# Patient Record
Sex: FEMALE | Race: WHITE | NOT HISPANIC OR LATINO | Employment: FULL TIME | URBAN - METROPOLITAN AREA
[De-identification: names, ages, dates, MRNs, and addresses within clinical notes are randomized per-mention and may not be internally consistent; named-entity substitution may affect disease eponyms.]

---

## 2017-01-05 ENCOUNTER — ALLSCRIPTS OFFICE VISIT (OUTPATIENT)
Dept: OTHER | Facility: OTHER | Age: 47
End: 2017-01-05

## 2017-01-05 DIAGNOSIS — R63.4 ABNORMAL WEIGHT LOSS: ICD-10-CM

## 2017-01-05 DIAGNOSIS — R35.1 NOCTURIA: ICD-10-CM

## 2017-01-05 DIAGNOSIS — F10.10 UNCOMPLICATED ALCOHOL ABUSE: ICD-10-CM

## 2017-01-05 DIAGNOSIS — D64.9 ANEMIA: ICD-10-CM

## 2017-01-05 DIAGNOSIS — Z12.31 ENCOUNTER FOR SCREENING MAMMOGRAM FOR MALIGNANT NEOPLASM OF BREAST: ICD-10-CM

## 2017-09-26 ENCOUNTER — GENERIC CONVERSION - ENCOUNTER (OUTPATIENT)
Dept: OTHER | Facility: OTHER | Age: 47
End: 2017-09-26

## 2018-01-13 VITALS
DIASTOLIC BLOOD PRESSURE: 60 MMHG | HEIGHT: 66 IN | BODY MASS INDEX: 18.22 KG/M2 | SYSTOLIC BLOOD PRESSURE: 130 MMHG | HEART RATE: 96 BPM | WEIGHT: 113.38 LBS

## 2019-01-25 ENCOUNTER — TRANSCRIBE ORDERS (OUTPATIENT)
Dept: ADMINISTRATIVE | Facility: HOSPITAL | Age: 49
End: 2019-01-25

## 2019-01-25 DIAGNOSIS — R20.0 NUMBNESS: Primary | ICD-10-CM

## 2019-11-30 ENCOUNTER — APPOINTMENT (EMERGENCY)
Dept: NON INVASIVE DIAGNOSTICS | Facility: HOSPITAL | Age: 49
End: 2019-11-30
Payer: COMMERCIAL

## 2019-11-30 ENCOUNTER — HOSPITAL ENCOUNTER (EMERGENCY)
Facility: HOSPITAL | Age: 49
Discharge: HOME | End: 2019-11-30
Attending: EMERGENCY MEDICINE
Payer: COMMERCIAL

## 2019-11-30 VITALS
WEIGHT: 110.23 LBS | DIASTOLIC BLOOD PRESSURE: 81 MMHG | RESPIRATION RATE: 18 BRPM | HEART RATE: 109 BPM | TEMPERATURE: 99.3 F | OXYGEN SATURATION: 100 % | BODY MASS INDEX: 18.06 KG/M2 | SYSTOLIC BLOOD PRESSURE: 121 MMHG

## 2019-11-30 DIAGNOSIS — I82.409 ACUTE DVT (DEEP VENOUS THROMBOSIS) (HCC): Primary | ICD-10-CM

## 2019-11-30 DIAGNOSIS — R60.0 BILATERAL LEG EDEMA: ICD-10-CM

## 2019-11-30 LAB
ANION GAP SERPL CALCULATED.3IONS-SCNC: 10 MMOL/L (ref 4–13)
BUN SERPL-MCNC: 11 MG/DL (ref 5–25)
CALCIUM SERPL-MCNC: 8.2 MG/DL (ref 8.3–10.1)
CHLORIDE SERPL-SCNC: 105 MMOL/L (ref 100–108)
CO2 SERPL-SCNC: 22 MMOL/L (ref 21–32)
CREAT SERPL-MCNC: 0.84 MG/DL (ref 0.6–1.3)
D DIMER PPP FEU-MCNC: 1.09 UG/ML FEU
ERYTHROCYTE [DISTWIDTH] IN BLOOD BY AUTOMATED COUNT: 13.8 % (ref 11.6–15.1)
GFR SERPL CREATININE-BSD FRML MDRD: 93 ML/MIN/1.73SQ M
GLUCOSE SERPL-MCNC: 110 MG/DL (ref 65–140)
HCT VFR BLD AUTO: 30 % (ref 34.8–46.1)
HGB BLD-MCNC: 10.4 G/DL (ref 11.5–15.4)
MCH RBC QN AUTO: 33.7 PG (ref 26.8–34.3)
MCHC RBC AUTO-ENTMCNC: 34.7 G/DL (ref 31.4–37.4)
MCV RBC AUTO: 97 FL (ref 82–98)
PLATELET # BLD AUTO: 227 THOUSANDS/UL (ref 149–390)
PMV BLD AUTO: 9.2 FL (ref 8.9–12.7)
POTASSIUM SERPL-SCNC: 3.7 MMOL/L (ref 3.5–5.3)
RBC # BLD AUTO: 3.09 MILLION/UL (ref 3.81–5.12)
SODIUM SERPL-SCNC: 137 MMOL/L (ref 136–145)
WBC # BLD AUTO: 14.58 THOUSAND/UL (ref 4.31–10.16)

## 2019-11-30 PROCEDURE — 36415 COLL VENOUS BLD VENIPUNCTURE: CPT | Performed by: EMERGENCY MEDICINE

## 2019-11-30 PROCEDURE — 93970 EXTREMITY STUDY: CPT

## 2019-11-30 PROCEDURE — 99284 EMERGENCY DEPT VISIT MOD MDM: CPT

## 2019-11-30 PROCEDURE — 99284 EMERGENCY DEPT VISIT MOD MDM: CPT | Performed by: EMERGENCY MEDICINE

## 2019-11-30 PROCEDURE — 85027 COMPLETE CBC AUTOMATED: CPT | Performed by: EMERGENCY MEDICINE

## 2019-11-30 PROCEDURE — 85379 FIBRIN DEGRADATION QUANT: CPT | Performed by: EMERGENCY MEDICINE

## 2019-11-30 PROCEDURE — 80048 BASIC METABOLIC PNL TOTAL CA: CPT | Performed by: EMERGENCY MEDICINE

## 2019-11-30 RX ORDER — AZITHROMYCIN 250 MG/1
TABLET, FILM COATED ORAL
COMMUNITY
Start: 2019-11-25 | End: 2019-11-30

## 2019-11-30 RX ORDER — HYDROXYZINE HYDROCHLORIDE 25 MG/1
25-50 TABLET, FILM COATED ORAL EVERY 8 HOURS PRN
COMMUNITY
Start: 2019-08-13

## 2019-11-30 NOTE — DISCHARGE INSTRUCTIONS
Deep Venous Thrombosis   WHAT YOU NEED TO KNOW:   Deep venous thrombosis (DVT) is a blood clot that forms in a deep vein of the body  The deep veins in the legs, thighs, and hips are the most common sites for DVT  DVT can also occur in a deep vein within your arms  The clot prevents the normal flow of blood in the vein  The blood backs up and causes pain and swelling  The DVT can break into smaller pieces and travel to your lungs and cause a blockage called a pulmonary embolism  A pulmonary embolism can become life-threatening  DISCHARGE INSTRUCTIONS:   Call 911 for any of the following: You feel lightheaded, short of breath, and have chest pain  You cough up blood  Return to the emergency department if:   Your symptoms get worse  You develop new DVT symptoms in another leg or arm  Contact your healthcare provider if:   Your gums or nose bleed  You see blood in your urine or bowel movements  Your bowel movements are black or darker than normal     You have questions or concerns about your conditions or care

## 2019-11-30 NOTE — ED PROVIDER NOTES
Emergency Department Note- Orquidea Chiang 44 y o  female MRN: [id number]    Unit/Bed#: ED 28 Encounter: [id number]        History of Present Illness     57-year-old female presents today with several complaints  She says for the past several months she has noticed some increased bilateral lower extremity edema, typically of her ankles, usually much worse after she stands a long time throughout the day, is much better in the mornings when she 1st wakes up  She says the last 1 week she has noticed that her left calf and left ankle have been more swollen than the right and the amount of edema has increased somewhat  She has also noticed over the last several days some increased swelling in the soft tissue of the inside of her left and right knee  She has no pain, she does have some chronic decreased sensation to both of her feet and says it feels like his she is wearing shoes when she is not even wearing shoes  Patient denies any prolonged travel history, no recent long travel, no immobilizations, or hospitalizations  No chest pain, no shortness of breath, no orthopnea  She has also noticed some facial swelling for the past 5 days, had a sore throat for the past 1 week  Hurts a little to swallow but is able to swallow without difficulty  No voice change  No fever, no chills, no headache, stiff neck  She says she has talked to her doctor several times about the lower extremity edema and been told that it is normal, she does not believe she has had any investigations regarding this  She did see her primary care physician 5 days ago regarding her sore throat, was prescribed a Z-Eyad which she started 2 days later and is currently on day 3  She says that she also mentioned the edema to her doctor and no testing was done  She presents today because of the concerns regarding the facial swelling and the edema and wanting to know what is causing that          REVIEW OF SYSTEMS     Constitutional:  No recent weight gains or losses   Eyes:  No visual changes   ENT:  No tinnitus or hearing changes   Cardiac: No chest pain or palpitations   Respiratory:  No cough or shortness of breath   Abdominal:  No nausea or vomiting   Urinary: No dysuria or hematuria   Hematologic: No easy bruising or bleeding   Skin: No rash   Musculoskeletal: As per HPI   Neurologic: As per HPI   Psychiatric: No mood changes      Historical Information   History reviewed  No pertinent past medical history  History reviewed  No pertinent surgical history  Social History   Social History     Substance and Sexual Activity   Alcohol Use Yes    Frequency: Never     Social History     Substance and Sexual Activity   Drug Use Never     Social History     Tobacco Use   Smoking Status Current Every Day Smoker    Packs/day: 0 50    Types: Cigarettes   Smokeless Tobacco Never Used     Family History: History reviewed  No pertinent family history  MEDICATIONS:  Azithromycin Z-Eyad  ALLERGIES:  Allergies   Allergen Reactions    Shellfish-Derived Products        Vitals: Blood pressure 121/81, pulse (!) 109, temperature 99 3 °F (37 4 °C), temperature source Temporal, resp  rate 18, weight 50 kg (110 lb 3 7 oz), SpO2 100 %  PHYSICAL EXAM    General:  Patient is well-appearing  Head:  Atraumatic  Eyes:  Conjunctiva pink  ENT:  Mucous membranes are moist, Mucous membranes moist  No swelling of the posterior pharynx  No tonsillar enlargement, exudate, lesions  No swelling in the floor of the mouth  No uvula deviation  No trismus  No swelling over either parotid gland, there is no facial erythema, I do not appreciate any facial edema  There is no palpable stone or abscess in either cheek    Neck:  Supple, no stridor, no internal jugular vein tenderness  Cardiac:  S1-S2, without murmurs  Lungs:  Clear to auscultation bilaterally, no rales, no rhonchi  Abdomen:  Soft, nontender, normal bowel sounds, no CVA tenderness, no tympany, no rigidity, no guarding  Extremities:  She has some mild bilateral pitting pedal edema, left calf and ankle are 1 cm greater than the right  There is no warmth or redness on either leg, compartments are soft bilaterally  There is no ligamentous laxity, no warmth, no redness, negative Lachman, negative Adams, negative valgus and varus stress test, no effusion on either knee  She has no warmth or redness to the bilateral hips, knees, ankles  DP and PT pulses are intact bilaterally  No bony tenderness to bilateral hips, femurs, knees, tibia, fibula, foot or ankle  Sensation light touch is equal and symmetric throughout the bilateral lower extremities although she does say it feels a little bit decreased in a stocking-like distribution from her mid ankles down bilaterally  Neurologic:  Awake, fluent speech, normal comprehension  AAOx3  Skin:  Pink warm and dry  Psychiatric:  Alert, pleasant, cooperative    Labs Reviewed   BASIC METABOLIC PANEL - Abnormal       Result Value Ref Range Status    Sodium 137  136 - 145 mmol/L Final    Potassium 3 7  3 5 - 5 3 mmol/L Final    Chloride 105  100 - 108 mmol/L Final    CO2 22  21 - 32 mmol/L Final    ANION GAP 10  4 - 13 mmol/L Final    BUN 11  5 - 25 mg/dL Final    Creatinine 0 84  0 60 - 1 30 mg/dL Final    Comment: Standardized to IDMS reference method    Glucose 110  65 - 140 mg/dL Final    Comment:   If the patient is fasting, the ADA then defines impaired fasting glucose as > 100 mg/dL and diabetes as > or equal to 123 mg/dL  Specimen collection should occur prior to Sulfasalazine administration due to the potential for falsely depressed results  Specimen collection should occur prior to Sulfapyridine administration due to the potential for falsely elevated results      Calcium 8 2 (*) 8 3 - 10 1 mg/dL Final    eGFR 93  ml/min/1 73sq m Final    Narrative:     [place] guidelines for Chronic Kidney Disease (CKD):     Stage 1 with normal or high GFR (GFR > 90 mL/min/1 73 square meters)    Stage 2 Mild CKD (GFR = 60-89 mL/min/1 73 square meters)    Stage 3A Moderate CKD (GFR = 45-59 mL/min/1 73 square meters)    Stage 3B Moderate CKD (GFR = 30-44 mL/min/1 73 square meters)    Stage 4 Severe CKD (GFR = 15-29 mL/min/1 73 square meters)    Stage 5 End Stage CKD (GFR <15 mL/min/1 73 square meters)  Note: GFR calculation is accurate only with a steady state creatinine   CBC AND PLATELET - Abnormal    WBC 14 58 (*) 4 31 - 10 16 Thousand/uL Final    RBC 3 09 (*) 3 81 - 5 12 Million/uL Final    Hemoglobin 10 4 (*) 11 5 - 15 4 g/dL Final    Hematocrit 30 0 (*) 34 8 - 46 1 % Final    MCV 97  82 - 98 fL Final    MCH 33 7  26 8 - 34 3 pg Final    MCHC 34 7  31 4 - 37 4 g/dL Final    RDW 13 8  11 6 - 15 1 % Final    Platelets 825  242 - 390 Thousands/uL Final    MPV 9 2  8 9 - 12 7 fL Final   D-DIMER, QUANTITATIVE - Abnormal    D-Dimer, Quant 1 09 (*) <0 50 ug/ml FEU Final    Comment:   Reference and upper limits to exclude DVT and PE are the same  Do not use to exclude if clinical symptoms are present  Pregnant women:  1st trimester:  <0 22 - 1 06 ug/ml FEU  2nd trimester:  <0 22 - 1 88 ug/ml FEU  3rd trimester:   0 24 - 3 28 ug/ml FEU    Note: Normal ranges may not apply to patients who are transgender, non-binary, or whose legal sex, sex at birth, and gender identity differ  Medications - No data to display    VAS lower limb venous duplex study, complete bilateral    (Results Pending)       Vitals:    11/30/19 1313 11/30/19 1611   BP: 113/70 121/81   TempSrc: Temporal    Pulse: (!) 115 (!) 109   Resp: 16 18   Patient Position - Orthostatic VS: Sitting Lying   Temp: 99 3 °F (37 4 °C)        ED Course as of Nov 30 1636   Sat Nov 30, 2019   1620 Bilateral lower extremity venous Dopplers were reported to me by vascular to show an acute right peroneal vein DVT      1622 On reassessment there was no change from the above physical exam findings    Patient denies having any rectal bleeding or noticing any bleeding anywhere          Assessment/Plan     ED Medical Decision Making:    Suspect the patient has leg swelling is secondary to the peroneal vein DVT  She also may have an element of dependent edema, since she does have some swelling on the contralateral leg as well  While she has been little tachycardic, she has been little anxious here in the ED, has no chest pain, no shortness of breath, do not believe she requires further workup for PE at this point  Additionally units she were to have a pulmonary embolism, her PESI score 51 puts her at very low risk and I believe that outpatient treatment for her symptoms is appropriate  She is also quite comfortable going home with outpatient follow-up  Supportive care, importance of follow-up and return precautions were discussed with the patient  Patient expressed understanding  Time reflects when diagnosis was documented in both MDM as applicable and the Disposition within this note     Time User Action Codes Description Comment    11/30/2019  4:26 PM Cullen Franz Add [I82 [id number]] Acute DVT (deep venous thrombosis) ([facility] [address] )     11/30/2019  4:26 PM Cullen Franz Add [R60 0] Bilateral leg edema       ED Disposition     ED Disposition Condition Date/Time Comment    Discharge Stable Sat Nov 30, 2019  4:26 PM Shruthi Salgado discharge to home/self care              Follow-up Information     Follow up With Specialties Details Why Contact Info    Raheel Mcnally MD Emergency Medicine Schedule an appointment as soon as possible for a visit in 2 days  [address] [facility]  [address]  [place] [address]  [phone]            Discharge Medication List as of 11/30/2019  4:29 PM      START taking these medications    Details   apixaban (ELIQUIS) 5 mg Take 10 mg PO BID x 7, then 5 mg PO BID from there on, [address], DO  11/30/19 8216

## 2019-12-01 PROCEDURE — 93970 EXTREMITY STUDY: CPT | Performed by: SURGERY

## 2021-12-23 ENCOUNTER — APPOINTMENT (EMERGENCY)
Dept: CT IMAGING | Facility: HOSPITAL | Age: 51
End: 2021-12-23
Payer: COMMERCIAL

## 2021-12-23 ENCOUNTER — HOSPITAL ENCOUNTER (EMERGENCY)
Facility: HOSPITAL | Age: 51
Discharge: HOME | End: 2021-12-23
Attending: EMERGENCY MEDICINE
Payer: COMMERCIAL

## 2021-12-23 VITALS
HEART RATE: 86 BPM | OXYGEN SATURATION: 100 % | RESPIRATION RATE: 16 BRPM | TEMPERATURE: 97.8 F | SYSTOLIC BLOOD PRESSURE: 125 MMHG | DIASTOLIC BLOOD PRESSURE: 82 MMHG

## 2021-12-23 DIAGNOSIS — R41.3 MEMORY CHANGE: Primary | ICD-10-CM

## 2021-12-23 LAB
ANION GAP SERPL CALCULATED.3IONS-SCNC: 6 MMOL/L (ref 5–14)
ATRIAL RATE: 99 BPM
BASOPHILS # BLD AUTO: 0.09 THOUSAND/UL (ref 0–0.1)
BASOPHILS NFR MAR MANUAL: 1 % (ref 0–1)
BUN SERPL-MCNC: <2 MG/DL (ref 5–25)
CALCIUM SERPL-MCNC: 9.5 MG/DL (ref 8.4–10.2)
CHLORIDE SERPL-SCNC: 100 MMOL/L (ref 97–108)
CO2 SERPL-SCNC: 30 MMOL/L (ref 22–30)
CREAT SERPL-MCNC: 0.62 MG/DL (ref 0.6–1.2)
EOSINOPHIL # BLD AUTO: 0.09 THOUSAND/UL (ref 0–0.4)
EOSINOPHIL NFR BLD MANUAL: 1 % (ref 0–6)
ERYTHROCYTE [DISTWIDTH] IN BLOOD BY AUTOMATED COUNT: 13 %
ETHANOL SERPL-MCNC: <10 MG/DL (ref 0–10)
FLUAV RNA RESP QL NAA+PROBE: NEGATIVE
FLUBV RNA RESP QL NAA+PROBE: NEGATIVE
GFR SERPL CREATININE-BSD FRML MDRD: 103 ML/MIN/1.73SQ M
GLUCOSE SERPL-MCNC: 129 MG/DL (ref 70–99)
HCT VFR BLD AUTO: 41.6 % (ref 36–46)
HGB BLD-MCNC: 13.9 G/DL (ref 12–16)
LYMPHOCYTES # BLD AUTO: 3.27 THOUSAND/UL (ref 0.5–4)
LYMPHOCYTES # BLD AUTO: 38 % (ref 25–45)
MCH RBC QN AUTO: 32.4 PG (ref 26–34)
MCHC RBC AUTO-ENTMCNC: 33.4 G/DL (ref 31–36)
MCV RBC AUTO: 97 FL (ref 80–100)
MONOCYTES # BLD AUTO: 1.29 THOUSAND/UL (ref 0.2–0.9)
MONOCYTES NFR BLD AUTO: 15 % (ref 1–10)
NEUTS SEG # BLD: 3.87 THOUSAND/UL (ref 1.8–7.8)
NEUTS SEG NFR BLD AUTO: 45 %
P AXIS: 77 DEGREES
PLATELET # BLD AUTO: 404 THOUSANDS/UL (ref 150–450)
PLATELET BLD QL SMEAR: ABNORMAL
PMV BLD AUTO: 7.1 FL (ref 8.9–12.7)
POTASSIUM SERPL-SCNC: 3.3 MMOL/L (ref 3.6–5)
PR INTERVAL: 124 MS
QRS AXIS: 53 DEGREES
QRSD INTERVAL: 72 MS
QT INTERVAL: 402 MS
QTC INTERVAL: 515 MS
RBC # BLD AUTO: 4.3 MILLION/UL (ref 4–5.2)
RBC MORPH BLD: NORMAL
RSV RNA RESP QL NAA+PROBE: NEGATIVE
SARS-COV-2 RNA RESP QL NAA+PROBE: NEGATIVE
SODIUM SERPL-SCNC: 136 MMOL/L (ref 137–147)
T WAVE AXIS: 56 DEGREES
TOTAL CELLS COUNTED SPEC: 100
VENTRICULAR RATE: 99 BPM
WBC # BLD AUTO: 8.6 THOUSAND/UL (ref 4.5–11)

## 2021-12-23 PROCEDURE — 99284 EMERGENCY DEPT VISIT MOD MDM: CPT | Performed by: EMERGENCY MEDICINE

## 2021-12-23 PROCEDURE — 99285 EMERGENCY DEPT VISIT HI MDM: CPT

## 2021-12-23 PROCEDURE — 85027 COMPLETE CBC AUTOMATED: CPT | Performed by: EMERGENCY MEDICINE

## 2021-12-23 PROCEDURE — 0241U HB NFCT DS VIR RESP RNA 4 TRGT: CPT | Performed by: EMERGENCY MEDICINE

## 2021-12-23 PROCEDURE — 80048 BASIC METABOLIC PNL TOTAL CA: CPT | Performed by: EMERGENCY MEDICINE

## 2021-12-23 PROCEDURE — 93005 ELECTROCARDIOGRAM TRACING: CPT

## 2021-12-23 PROCEDURE — 82077 ASSAY SPEC XCP UR&BREATH IA: CPT | Performed by: EMERGENCY MEDICINE

## 2021-12-23 PROCEDURE — 85007 BL SMEAR W/DIFF WBC COUNT: CPT | Performed by: EMERGENCY MEDICINE

## 2021-12-23 PROCEDURE — G1004 CDSM NDSC: HCPCS

## 2021-12-23 PROCEDURE — 36415 COLL VENOUS BLD VENIPUNCTURE: CPT | Performed by: EMERGENCY MEDICINE

## 2021-12-23 PROCEDURE — 70450 CT HEAD/BRAIN W/O DYE: CPT

## 2021-12-23 PROCEDURE — 93010 ELECTROCARDIOGRAM REPORT: CPT | Performed by: INTERNAL MEDICINE

## 2021-12-23 RX ORDER — ACETAMINOPHEN 160 MG/5ML
SUSPENSION, ORAL (FINAL DOSE FORM) ORAL
Status: COMPLETED
Start: 2021-12-23 | End: 2021-12-23

## 2021-12-24 ENCOUNTER — TELEPHONE (OUTPATIENT)
Dept: OTHER | Facility: OTHER | Age: 51
End: 2021-12-24

## 2022-03-08 ENCOUNTER — APPOINTMENT (EMERGENCY)
Dept: CT IMAGING | Facility: HOSPITAL | Age: 52
End: 2022-03-08
Payer: MEDICARE

## 2022-03-08 ENCOUNTER — HOSPITAL ENCOUNTER (EMERGENCY)
Facility: HOSPITAL | Age: 52
Discharge: HOME | End: 2022-03-08
Attending: EMERGENCY MEDICINE | Admitting: EMERGENCY MEDICINE
Payer: MEDICARE

## 2022-03-08 VITALS
WEIGHT: 96.6 LBS | RESPIRATION RATE: 16 BRPM | HEART RATE: 85 BPM | TEMPERATURE: 97.9 F | OXYGEN SATURATION: 98 % | BODY MASS INDEX: 15.83 KG/M2 | SYSTOLIC BLOOD PRESSURE: 117 MMHG | DIASTOLIC BLOOD PRESSURE: 85 MMHG

## 2022-03-08 DIAGNOSIS — R55 SYNCOPE: ICD-10-CM

## 2022-03-08 DIAGNOSIS — N39.0 UTI (URINARY TRACT INFECTION): Primary | ICD-10-CM

## 2022-03-08 LAB
ALBUMIN SERPL BCP-MCNC: 3.9 G/DL (ref 3–5.2)
ALP SERPL-CCNC: 64 U/L (ref 43–122)
ALT SERPL W P-5'-P-CCNC: 12 U/L
AMPHETAMINES SERPL QL SCN: NEGATIVE
ANION GAP SERPL CALCULATED.3IONS-SCNC: 6 MMOL/L (ref 5–14)
AST SERPL W P-5'-P-CCNC: 25 U/L (ref 14–36)
BACTERIA UR QL AUTO: ABNORMAL /HPF
BARBITURATES UR QL: NEGATIVE
BASOPHILS # BLD AUTO: 0.1 THOUSANDS/ΜL (ref 0–0.1)
BASOPHILS NFR BLD AUTO: 1 % (ref 0–1)
BENZODIAZ UR QL: NEGATIVE
BILIRUB SERPL-MCNC: 0.52 MG/DL
BILIRUB UR QL STRIP: NEGATIVE
BUN SERPL-MCNC: 8 MG/DL (ref 5–25)
CALCIUM SERPL-MCNC: 8.7 MG/DL (ref 8.4–10.2)
CHLORIDE SERPL-SCNC: 106 MMOL/L (ref 97–108)
CLARITY UR: CLEAR
CO2 SERPL-SCNC: 25 MMOL/L (ref 22–30)
COCAINE UR QL: NEGATIVE
COLOR UR: ABNORMAL
CREAT SERPL-MCNC: 0.5 MG/DL (ref 0.6–1.2)
EOSINOPHIL # BLD AUTO: 0.1 THOUSAND/ΜL (ref 0–0.4)
EOSINOPHIL NFR BLD AUTO: 1 % (ref 0–6)
ERYTHROCYTE [DISTWIDTH] IN BLOOD BY AUTOMATED COUNT: 14.4 %
GFR SERPL CREATININE-BSD FRML MDRD: 110 ML/MIN/1.73SQ M
GLUCOSE SERPL-MCNC: 86 MG/DL (ref 70–99)
GLUCOSE UR STRIP-MCNC: NEGATIVE MG/DL
HCT VFR BLD AUTO: 42.3 % (ref 36–46)
HGB BLD-MCNC: 14.4 G/DL (ref 12–16)
HGB UR QL STRIP.AUTO: NEGATIVE
KETONES UR STRIP-MCNC: ABNORMAL MG/DL
LEUKOCYTE ESTERASE UR QL STRIP: 25
LYMPHOCYTES # BLD AUTO: 2.7 THOUSANDS/ΜL (ref 0.5–4)
LYMPHOCYTES NFR BLD AUTO: 33 % (ref 25–45)
MAGNESIUM SERPL-MCNC: 1.8 MG/DL (ref 1.6–2.3)
MCH RBC QN AUTO: 31.8 PG (ref 26–34)
MCHC RBC AUTO-ENTMCNC: 34.1 G/DL (ref 31–36)
MCV RBC AUTO: 93 FL (ref 80–100)
METHADONE UR QL: NEGATIVE
MONOCYTES # BLD AUTO: 0.9 THOUSAND/ΜL (ref 0.2–0.9)
MONOCYTES NFR BLD AUTO: 11 % (ref 1–10)
NEUTROPHILS # BLD AUTO: 4.4 THOUSANDS/ΜL (ref 1.8–7.8)
NEUTS SEG NFR BLD AUTO: 54 % (ref 45–65)
NITRITE UR QL STRIP: POSITIVE
NON-SQ EPI CELLS URNS QL MICRO: ABNORMAL /HPF
OPIATES UR QL SCN: NEGATIVE
OXYCODONE+OXYMORPHONE UR QL SCN: NEGATIVE
PCP UR QL: NEGATIVE
PH UR STRIP.AUTO: 6 [PH]
PHOSPHATE SERPL-MCNC: 3.6 MG/DL (ref 2.5–4.8)
PLATELET # BLD AUTO: 316 THOUSANDS/UL (ref 150–450)
PMV BLD AUTO: 9.3 FL (ref 8.9–12.7)
POTASSIUM SERPL-SCNC: 4.2 MMOL/L (ref 3.6–5)
PROT SERPL-MCNC: 7.5 G/DL (ref 5.9–8.4)
PROT UR STRIP-MCNC: NEGATIVE MG/DL
RBC # BLD AUTO: 4.54 MILLION/UL (ref 4–5.2)
RBC #/AREA URNS AUTO: ABNORMAL /HPF
SODIUM SERPL-SCNC: 137 MMOL/L (ref 137–147)
SP GR UR STRIP.AUTO: 1.01 (ref 1–1.04)
THC UR QL: NEGATIVE
UROBILINOGEN UA: NEGATIVE MG/DL
WBC # BLD AUTO: 8.1 THOUSAND/UL (ref 4.5–11)
WBC #/AREA URNS AUTO: ABNORMAL /HPF

## 2022-03-08 PROCEDURE — 85025 COMPLETE CBC W/AUTO DIFF WBC: CPT | Performed by: EMERGENCY MEDICINE

## 2022-03-08 PROCEDURE — 99284 EMERGENCY DEPT VISIT MOD MDM: CPT

## 2022-03-08 PROCEDURE — 83735 ASSAY OF MAGNESIUM: CPT | Performed by: EMERGENCY MEDICINE

## 2022-03-08 PROCEDURE — 80307 DRUG TEST PRSMV CHEM ANLYZR: CPT | Performed by: EMERGENCY MEDICINE

## 2022-03-08 PROCEDURE — 70450 CT HEAD/BRAIN W/O DYE: CPT

## 2022-03-08 PROCEDURE — 81001 URINALYSIS AUTO W/SCOPE: CPT | Performed by: EMERGENCY MEDICINE

## 2022-03-08 PROCEDURE — 80053 COMPREHEN METABOLIC PANEL: CPT | Performed by: EMERGENCY MEDICINE

## 2022-03-08 PROCEDURE — 36415 COLL VENOUS BLD VENIPUNCTURE: CPT | Performed by: EMERGENCY MEDICINE

## 2022-03-08 PROCEDURE — 84100 ASSAY OF PHOSPHORUS: CPT | Performed by: EMERGENCY MEDICINE

## 2022-03-08 PROCEDURE — G1004 CDSM NDSC: HCPCS

## 2022-03-08 PROCEDURE — 99284 EMERGENCY DEPT VISIT MOD MDM: CPT | Performed by: EMERGENCY MEDICINE

## 2022-03-08 PROCEDURE — 96360 HYDRATION IV INFUSION INIT: CPT

## 2022-03-08 PROCEDURE — 96361 HYDRATE IV INFUSION ADD-ON: CPT

## 2022-03-08 RX ORDER — NITROFURANTOIN 25; 75 MG/1; MG/1
100 CAPSULE ORAL 2 TIMES DAILY WITH MEALS
Status: DISCONTINUED | OUTPATIENT
Start: 2022-03-09 | End: 2022-03-08

## 2022-03-08 RX ORDER — NITROFURANTOIN 25; 75 MG/1; MG/1
100 CAPSULE ORAL ONCE
Status: COMPLETED | OUTPATIENT
Start: 2022-03-08 | End: 2022-03-08

## 2022-03-08 RX ORDER — NITROFURANTOIN 25; 75 MG/1; MG/1
100 CAPSULE ORAL 2 TIMES DAILY
Qty: 10 CAPSULE | Refills: 0 | Status: SHIPPED | OUTPATIENT
Start: 2022-03-08

## 2022-03-08 RX ORDER — NITROFURANTOIN 25; 75 MG/1; MG/1
100 CAPSULE ORAL 2 TIMES DAILY
Qty: 10 CAPSULE | Refills: 0 | Status: SHIPPED | OUTPATIENT
Start: 2022-03-08 | End: 2022-03-08 | Stop reason: SDUPTHER

## 2022-03-08 RX ADMIN — NITROFURANTOIN (MONOHYDRATE/MACROCRYSTALS) 100 MG: 75; 25 CAPSULE ORAL at 19:24

## 2022-03-08 RX ADMIN — SODIUM CHLORIDE 1000 ML: 0.9 INJECTION, SOLUTION INTRAVENOUS at 16:40

## 2022-03-08 NOTE — ED NOTES
RN asked provider for medication due to patient complaining of a [address], RN  03/08/22 5418

## 2022-03-08 NOTE — ED PROVIDER NOTES
History  Chief Complaint   Patient presents with    Psychiatric Evaluation     pt arrives via EMS after  called for a psych eval, per  pt has been acting "irrational"  pt denies SI/HI/AH/VH  pt is currently talking to herself     69-year-old female presenting to the emergency room with syncopal episode   notes that she passed out and nephews found on the ground  Once they woke her up she was quite agitated, she did not recall who they were  She has been having episodes of memory loss for the past few months and has been seeing Neurology without diagnosis  She is on Xarelto for DVT  History provided by:  Patient  Psychiatric Evaluation  Presenting symptoms: agitation and bizarre behavior    Presenting symptoms: no aggressive behavior    Patient accompanied by:  Caregiver  Degree of incapacity (severity):  Severe  Onset quality:  Gradual  Duration:  4 months  Timing:  Constant  Progression:  Worsening  Chronicity:  Chronic  Treatment compliance: All of the time  Relieved by:  Nothing  Worsened by:  Nothing  Ineffective treatments:  None tried  Associated symptoms: no abdominal pain and no chest pain        Prior to Admission Medications   Prescriptions Last Dose Informant Patient Reported? Taking? apixaban (ELIQUIS) 5 mg   No No   Sig: Take 10 mg PO BID x 7, then 5 mg PO BID from there on   hydrOXYzine HCL (ATARAX) 25 mg tablet   Yes No   Sig: Take 25-50 mg by mouth every 8 (eight) hours as needed      Facility-Administered Medications: None       History reviewed  No pertinent past medical history  History reviewed  No pertinent surgical history  History reviewed  No pertinent family history  I have reviewed and agree with the history as documented      E-Cigarette/Vaping     E-Cigarette/Vaping Substances     Social History     Tobacco Use    Smoking status: Current Every Day Smoker     Packs/day: 0 50     Types: Cigarettes    Smokeless tobacco: Never Used   Substance Use Topics    Alcohol use: Yes    Drug use: Never       Review of Systems   Constitutional: Negative for chills and fever  HENT: Negative for ear pain and sore throat  Eyes: Negative for pain and visual disturbance  Respiratory: Negative for cough and shortness of breath  Cardiovascular: Negative for chest pain and palpitations  Gastrointestinal: Negative for abdominal pain and vomiting  Genitourinary: Negative for dysuria and hematuria  Musculoskeletal: Negative for arthralgias and back pain  Skin: Negative for color change and rash  Neurological: Negative for seizures and syncope  Psychiatric/Behavioral: Positive for agitation and confusion  All other systems reviewed and are negative  Physical Exam  Physical Exam  Vitals and nursing note reviewed  Constitutional:       General: She is not in acute distress  Appearance: She is well-developed  HENT:      Head: Normocephalic and atraumatic  Nose: Nose normal       Mouth/Throat:      Mouth: Mucous membranes are moist    Eyes:      Conjunctiva/sclera: Conjunctivae normal    Cardiovascular:      Rate and Rhythm: Normal rate and regular rhythm  Heart sounds: No murmur heard  Pulmonary:      Effort: Pulmonary effort is normal  No respiratory distress  Breath sounds: Normal breath sounds  Abdominal:      Palpations: Abdomen is soft  Tenderness: There is no abdominal tenderness  Musculoskeletal:      Cervical back: Neck supple  Skin:     General: Skin is warm and dry  Capillary Refill: Capillary refill takes less than 2 seconds  Neurological:      Mental Status: She is alert and oriented to person, place, and time  Psychiatric:      Comments: Patient quite confused as to why she is here, she does not recall any events from earlier today  She denies SI, HI           Vital Signs  ED Triage Vitals [03/08/22 1558]   Temperature Pulse Respirations Blood Pressure SpO2   97 9 °F (36 6 °C) 99 16 121/97 100 %      Temp Source Heart Rate Source Patient Position - Orthostatic VS BP Location FiO2 (%)   Oral Monitor Lying Right arm --      Pain Score       --           Vitals:    03/08/22 1558 03/08/22 1728   BP: 121/97 117/85   Pulse: 99 85   Patient Position - Orthostatic VS: Lying          Visual Acuity  Visual Acuity      Most Recent Value   L Pupil Size (mm) 3   R Pupil Size (mm) 3          ED Medications  Medications   sodium chloride 0 9 % bolus 1,000 mL (0 mL Intravenous Stopped 3/8/22 1821)   nitrofurantoin (MACROBID) extended-release capsule 100 mg (100 mg Oral Given 3/8/22 1924)       Diagnostic Studies  Results Reviewed     Procedure Component Value Units Date/Time    Urine Microscopic [[id number]]  (Abnormal) Collected: 03/08/22 1821    Lab Status: Final result Specimen: Urine, Clean Catch Updated: 03/08/22 1925     RBC, UA None Seen /hpf      WBC, UA 2-4 /hpf      Epithelial Cells Occasional /hpf      Bacteria, UA Innumerable /hpf     Rapid drug screen, urine [[id number]]  (Normal) Collected: 03/08/22 1821    Lab Status: Final result Specimen: Urine, Clean Catch Updated: 03/08/22 1911     Amph/Meth UR Negative     Barbiturate Ur Negative     Benzodiazepine Urine Negative     Cocaine Urine Negative     Methadone Urine Negative     Opiate Urine Negative     PCP Ur Negative     THC Urine Negative     Oxycodone Urine Negative    Narrative:      FOR MEDICAL PURPOSES ONLY  IF CONFIRMATION NEEDED PLEASE CONTACT THE LAB WITHIN 5 DAYS      Drug Screen Cutoff Levels:  AMPHETAMINE/METHAMPHETAMINES  1000 ng/mL  BARBITURATES     200 ng/mL  BENZODIAZEPINES     200 ng/mL  COCAINE      300 ng/mL  METHADONE      300 ng/mL  OPIATES      300 ng/mL  PHENCYCLIDINE     25 ng/mL  THC       50 ng/mL  OXYCODONE      100 ng/mL    UA (URINE) with reflex to Scope [[id number]]  (Abnormal) Collected: 03/08/22 1821    Lab Status: Final result Specimen: Urine, Clean Catch Updated: 03/08/22 1854     Color, UA Straw     Clarity, UA Clear Specific [place], UA 1 010     pH, UA 6 0     Leukocytes, UA 25 0     Nitrite, UA Positive     Protein, UA Negative mg/dl      Glucose, UA Negative mg/dl      Ketones, UA 15 (1+) mg/dl      Bilirubin, UA Negative     Blood, UA Negative     UROBILINOGEN UA Negative mg/dL     Phosphorus [[id number]]  (Normal) Collected: 03/08/22 1707    Lab Status: Final result Specimen: Blood from Arm, Right Updated: 03/08/22 1726     Phosphorus 3 6 mg/dL     Magnesium [[id number]]  (Normal) Collected: 03/08/22 1707    Lab Status: Final result Specimen: Blood from Arm, Right Updated: 03/08/22 1726     Magnesium 1 8 mg/dL     Comprehensive metabolic panel [[id number]]  (Abnormal) Collected: 03/08/22 1707    Lab Status: Final result Specimen: Blood from Arm, Right Updated: 03/08/22 1726     Sodium 137 mmol/L      Potassium 4 2 mmol/L      Chloride 106 mmol/L      CO2 25 mmol/L      ANION GAP 6 mmol/L      BUN 8 mg/dL      Creatinine 0 50 mg/dL      Glucose 86 mg/dL      Calcium 8 7 mg/dL      AST 25 U/L      ALT 12 U/L      Alkaline Phosphatase 64 U/L      Total Protein 7 5 g/dL      Albumin 3 9 g/dL      Total Bilirubin 0 52 mg/dL      eGFR 110 ml/min/1 73sq m     Narrative:      [place] guidelines for Chronic Kidney Disease (CKD):     Stage 1 with normal or high GFR (GFR > 90 mL/min/1 73 square meters)    Stage 2 Mild CKD (GFR = 60-89 mL/min/1 73 square meters)    Stage 3A Moderate CKD (GFR = 45-59 mL/min/1 73 square meters)    Stage 3B Moderate CKD (GFR = 30-44 mL/min/1 73 square meters)    Stage 4 Severe CKD (GFR = 15-29 mL/min/1 73 square meters)    Stage 5 End Stage CKD (GFR <15 mL/min/1 73 square meters)  Note: GFR calculation is accurate only with a steady state creatinine    CBC and differential [[id number]]  (Abnormal) Collected: 03/08/22 1642    Lab Status: Final result Specimen: Blood from Arm, Right Updated: 03/08/22 1659     WBC 8 10 Thousand/uL      RBC 4 54 Million/uL      Hemoglobin 14 4 g/dL      Hematocrit 42 3 %      MCV 93 fL      MCH 31 8 pg      MCHC 34 1 g/dL      RDW 14 4 %      MPV 9 3 fL      Platelets 376 Thousands/uL      Neutrophils Relative 54 %      Lymphocytes Relative 33 %      Monocytes Relative 11 %      Eosinophils Relative 1 %      Basophils Relative 1 %      Neutrophils Absolute 4 40 Thousands/µL      Lymphocytes Absolute 2 70 Thousands/µL      Monocytes Absolute 0 90 Thousand/µL      Eosinophils Absolute 0 10 Thousand/µL      Basophils Absolute 0 10 Thousands/µL                  CT head without contrast   Final Result by Vernon Silva MD (03/08 1752)      No acute intracranial abnormality  Parenchymal Volume loss greater than expected for age  The study was marked in EPIC for immediate notification, owing to classification as trauma  Workstation performed: [id number]                    Procedures  Procedures         ED Course                               SBIRT 20yo+      Most Recent Value   SBIRT (24 yo +)    In order to provide better care to our patients, we are screening all of our patients for alcohol and drug use  Would it be okay to ask you these screening questions? 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  UA concerning for UTI, otherwise laboratory evaluation unremarkable  CT head unremarkable         Amount and/or Complexity of Data Reviewed  Clinical lab tests: ordered and reviewed  Tests in the radiology section of CPT®: ordered and reviewed  Tests in the medicine section of CPT®: reviewed and ordered    Risk of Complications, Morbidity, and/or Mortality  Presenting problems: moderate  Diagnostic procedures: high  Management options: moderate        Disposition  Final diagnoses:   UTI (urinary tract infection)   Syncope     Time reflects when diagnosis was documented in both MDM as applicable and the Disposition within this note     Time User Action Codes Description Comment    3/8/2022  7:22 PM Nalini Lancaster [N39 0] UTI (urinary tract infection)     3/8/2022  7:22 PM Nalini Lancaster [R55] Syncope       ED Disposition     ED Disposition Condition Date/Time Comment    Discharge Stable Tue Mar 8, 2022  7:22 PM King Ramirez discharge to home/self care  Follow-up Information     Follow up With Specialties Details Why Contact Info    Nikki Romero MD Emergency Medicine Call in 3 days  [address]  [address]  [phone]            Current Discharge Medication List      START taking these medications    Details   nitrofurantoin (MACROBID) 100 mg capsule Take 1 capsule (100 mg total) by mouth 2 (two) times a day  Qty: 10 capsule, Refills: 0    Associated Diagnoses: UTI (urinary tract infection)         CONTINUE these medications which have NOT CHANGED    Details   apixaban (ELIQUIS) 5 mg Take 10 mg PO BID x 7, then 5 mg PO BID from there on  Qty: 90 tablet, Refills: 0    Associated Diagnoses: Acute DVT (deep venous thrombosis) ([facility])      hydrOXYzine HCL (ATARAX) 25 mg tablet Take 25-50 mg by mouth every 8 (eight) hours as needed             No discharge procedures on file      PDMP Review     None          ED Provider  Electronically Signed by           Luis Damian MD  03/08/22 2057

## 2022-03-08 NOTE — ED NOTES
[place] indicates: [place]  Recipient #[id number]  [address] managed by [facility] [place]  Phone number: [phone]      Primary payor is [employer]

## 2022-03-08 NOTE — ED NOTES
Patient transported to [facility][note 3] Polo Bello RN  03/08/22 5822

## 2022-03-08 NOTE — ED NOTES
Pt able to ambulate with a steady gait, pt prefers to have stand by assistance due to using cane at home per       Sangita Gray RN  03/08/22 0472

## 2024-06-06 ENCOUNTER — OFFICE VISIT (OUTPATIENT)
Dept: DENTISTRY | Facility: CLINIC | Age: 54
End: 2024-06-06

## 2024-06-06 VITALS — DIASTOLIC BLOOD PRESSURE: 78 MMHG | TEMPERATURE: 97.3 F | HEART RATE: 101 BPM | SYSTOLIC BLOOD PRESSURE: 116 MMHG

## 2024-06-06 DIAGNOSIS — K08.89 PAIN, DENTAL: Primary | ICD-10-CM

## 2024-06-06 PROBLEM — R20.2 ARM PARESTHESIA, LEFT: Status: ACTIVE | Noted: 2019-01-25

## 2024-06-06 PROBLEM — D75.89 MACROCYTOSIS WITHOUT ANEMIA: Status: ACTIVE | Noted: 2020-03-03

## 2024-06-06 PROBLEM — M47.812 CERVICAL SPONDYLOSIS: Status: ACTIVE | Noted: 2019-09-25

## 2024-06-06 PROBLEM — M54.50 LUMBAR BACK PAIN: Status: ACTIVE | Noted: 2019-08-26

## 2024-06-06 PROBLEM — M47.817 SPONDYLOSIS OF LUMBOSACRAL JOINT WITHOUT MYELOPATHY: Status: ACTIVE | Noted: 2019-08-26

## 2024-06-06 PROBLEM — M48.062 SPINAL STENOSIS OF LUMBAR REGION WITH NEUROGENIC CLAUDICATION: Status: ACTIVE | Noted: 2019-08-26

## 2024-06-06 PROBLEM — M25.542 PAIN IN THUMB JOINT WITH MOVEMENT OF LEFT HAND: Status: ACTIVE | Noted: 2019-07-22

## 2024-06-06 PROBLEM — U07.1 COVID-19 VIRUS INFECTION: Status: ACTIVE | Noted: 2022-02-12

## 2024-06-06 PROBLEM — E51.9 VITAMIN B1 DEFICIENCY: Status: ACTIVE | Noted: 2022-02-11

## 2024-06-06 PROBLEM — M75.100: Status: ACTIVE | Noted: 2024-06-06

## 2024-06-06 PROBLEM — R63.4 ABNORMAL WEIGHT LOSS: Status: ACTIVE | Noted: 2017-01-05

## 2024-06-06 PROBLEM — F10.20 ALCOHOL DEPENDENCE (HCC): Status: ACTIVE | Noted: 2019-12-07

## 2024-06-06 PROBLEM — I82.553: Chronic | Status: ACTIVE | Noted: 2019-12-06

## 2024-06-06 PROBLEM — M51.36 DDD (DEGENERATIVE DISC DISEASE), LUMBAR: Status: ACTIVE | Noted: 2019-08-26

## 2024-06-06 PROBLEM — R79.89 HIGH SERUM LACTATE: Status: ACTIVE | Noted: 2019-12-06

## 2024-06-06 PROBLEM — R41.0 CONFUSION: Status: ACTIVE | Noted: 2022-04-06

## 2024-06-06 PROBLEM — R41.3 MEMORY LOSS: Status: ACTIVE | Noted: 2022-01-19

## 2024-06-06 PROBLEM — D75.839 THROMBOCYTOSIS: Status: ACTIVE | Noted: 2021-12-13

## 2024-06-06 PROBLEM — M85.80 OSTEOPENIA: Status: ACTIVE | Noted: 2021-08-18

## 2024-06-06 PROBLEM — R63.0 LOSS OF APPETITE: Status: ACTIVE | Noted: 2022-01-19

## 2024-06-06 PROBLEM — D25.1 FIBROIDS, INTRAMURAL: Status: ACTIVE | Noted: 2021-08-07

## 2024-06-06 PROBLEM — G89.29 CHRONIC LEFT SHOULDER PAIN: Status: ACTIVE | Noted: 2019-01-25

## 2024-06-06 PROBLEM — Z78.9 ALCOHOL USE: Status: ACTIVE | Noted: 2022-02-11

## 2024-06-06 PROBLEM — L21.9 SEBORRHEIC DERMATITIS OF SCALP: Status: ACTIVE | Noted: 2017-01-05

## 2024-06-06 PROBLEM — L30.9 DERMATITIS: Status: ACTIVE | Noted: 2017-01-05

## 2024-06-06 PROBLEM — M75.82 TENDINITIS OF LEFT ROTATOR CUFF: Status: ACTIVE | Noted: 2019-02-18

## 2024-06-06 PROBLEM — M48.02 FORAMINAL STENOSIS OF CERVICAL REGION: Status: ACTIVE | Noted: 2019-09-04

## 2024-06-06 PROBLEM — G60.9 HEREDITARY AND IDIOPATHIC PERIPHERAL NEUROPATHY: Chronic | Status: ACTIVE | Noted: 2018-08-08

## 2024-06-06 PROBLEM — E55.9 VITAMIN D DEFICIENCY: Chronic | Status: ACTIVE | Noted: 2018-10-03

## 2024-06-06 PROBLEM — M25.512 CHRONIC LEFT SHOULDER PAIN: Status: ACTIVE | Noted: 2019-01-25

## 2024-06-06 PROBLEM — R79.89 ELEVATED LFTS: Status: ACTIVE | Noted: 2019-12-06

## 2024-06-06 PROBLEM — E53.8 FOLIC ACID DEFICIENCY: Status: ACTIVE | Noted: 2020-09-03

## 2024-06-06 PROBLEM — R41.82 AMS (ALTERED MENTAL STATUS): Status: ACTIVE | Noted: 2022-02-11

## 2024-06-06 PROBLEM — R35.1 NOCTURIA: Status: ACTIVE | Noted: 2017-01-05

## 2024-06-06 PROBLEM — D64.9 ANEMIA: Status: ACTIVE | Noted: 2017-01-05

## 2024-06-06 PROBLEM — R74.02 ELEVATED SERUM LACTATE DEHYDROGENASE (LDH): Status: ACTIVE | Noted: 2019-12-16

## 2024-06-06 PROBLEM — R74.01 ELEVATED AST (SGOT): Status: ACTIVE | Noted: 2020-03-12

## 2024-06-06 PROBLEM — F17.210 CIGARETTE SMOKER: Status: ACTIVE | Noted: 2018-10-23

## 2024-06-06 PROBLEM — F41.1 ANXIETY STATE: Status: ACTIVE | Noted: 2024-06-06

## 2024-06-06 PROCEDURE — D0220 INTRAORAL - PERIAPICAL FIRST RADIOGRAPHIC IMAGE: HCPCS | Performed by: DENTAL HYGIENIST

## 2024-06-06 PROCEDURE — D0140 LIMITED ORAL EVALUATION - PROBLEM FOCUSED: HCPCS

## 2024-06-06 PROCEDURE — D7140 EXTRACTION, ERUPTED TOOTH OR EXPOSED ROOT (ELEVATION AND/OR FORCEPS REMOVAL): HCPCS | Performed by: DENTAL HYGIENIST

## 2024-06-06 RX ORDER — PANTOPRAZOLE SODIUM 40 MG/1
1 TABLET, DELAYED RELEASE ORAL EVERY MORNING
COMMUNITY
Start: 2024-03-18

## 2024-06-06 RX ORDER — LANOLIN ALCOHOL/MO/W.PET/CERES
100 CREAM (GRAM) TOPICAL DAILY
COMMUNITY
Start: 2024-03-14

## 2024-06-06 RX ORDER — LEVETIRACETAM 500 MG/1
500 TABLET ORAL 2 TIMES DAILY
COMMUNITY

## 2024-06-06 NOTE — PROGRESS NOTES
"Limited Exam    Libby Stovall 54 y.o. female presents with    to \[facility]\"" for Limited exam  PMH reviewed, no changes, ASA II. Significant medical history: DVT. Significant allergies: shellfish and oxycodone. Significant medications: xeralto.    Chief complaint: \"My lower left tooth on the bottom is very loose and it hurts. I have not been to the dentist in a while\"    Consent:  Discussed that limited exam focuses on problem area, and same day tx is not guaranteed.  Patient explained to if they wish to have anything else evaluated, they need to return to the practice at which they are a patient of record or receive a comprehensive exam.  Patient understands and consents.    Subjective history:    Onset: Months   Provocation: Cold, Hot, and Biting   Quality: Sharp and Shooting   Region: Patient points to tooth #20   Severity: 8/10   Timing: constant and keeps me up at night    Objective clinical findings:   Oral cancer screening: No abnormalities detected   Extraoral exam:   - [id number] hard smooth lump on pts left lower lip. Pt reports it has been there 20 years and has not grown and is asymptomatic. Suggest OMFS ref for biopsy at comp exam.  Intraoral exam:  - #20 class 3 mobility, gross calc build up, missing teeth    Radiographs: Select PA #20    Pulp testing:  #20 Cold: >10 second response ; Percussion: Abnormal response ; Palpation: Abnormal response    Assessment:  #20 Irreversible Pulpitis ; Symptomatic apical periodontitis    Plan:   Pt reports long time discomfort and mobility on #20. PAXR shows #20 with large bone loss making tooth non-restorable. Clinical exam shows class 3 mobility. [id number] hard smooth lump on pts left lower lip. Pt reports it has been there 20 years and has not grown and is asymptomatic. Suggest OMFS ref for biopsy at comp exam. 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 Pt does not have a dentist. Informed pt that we have time to extract " #20 today. Pt agreed.     Extraction # 20    Diagnosis:  Teeth #20 indicated for extraction due to Periodontally hopeless prognosis     Prognosis:  guarded    Consent:  Diagnosis and tx plan reviewed with patient.  Risks of specific procedure: pain, bleeding, swelling, infection, tooth fracturing to point of requiring surgical removal, damage to adjacent teeth and/or restorations on them.  Risks of any dental procedure: post procedural pain or sensitivity, local anesthetic side effects, allergic reaction to dental materials and medications, breakage of local anesthetic needle, aspiration of small dental tools, injury to nearby hard and soft tissues and anatomical structures.  Benefits: relieve pain or underlying infection, prevent future or further progression of infection.  Alternatives: no tx.  Oral surgery consent form reviewed and signed.  Patient understands and consents.    [facility] [place] Protocol  Other Assisting Provider: Yes, Sharyn Watts (assistant)  Verbal consent obtained? YES  Written consent obtained?  YES  Risks, benefits and alternatives discussed?: YES  Consent given by: the patient  Time Out  Immediately prior to the procedure a time out was called: YES  Time Out:  Time Out performed at:  9:15 AM  A time out verifies correct patient, procedure, equipment, support staff and site/side marked as required.  Patient states understanding of procedure being performed: YES  Patient's understanding of procedure matches consent: YES  Procedure consent matches procedure scheduled: YES  Test results available and properly labeled: N/A  Site  Verified with the patient  YES  Radiology Images displayed and confirmed.  If images not available, report reviewed:  YES  Required items - Required blood products, implants, devices and special equipment available: YES  Patient identity confirmed:  YES    Anesthesia:  Topical 20% benzocaine.  1.5 carps 4% Septocaine 1:100k epi via buccal and palatal/lingual infiltration and  mental nerve block.    Procedure details:  Reflected gingiva with periosteal elevator  Elevated, and extracted #20 with forceps  Socket curetted and irrigated with sterile saline  Manual alveolar compression with gauze 30 seconds. Hemostasis achieved    Post-op instructions given verbally and on paper.  Patient given gauze    Patient dismissed ambulatory and alert.    NV: Comp exam

## 2024-06-06 NOTE — DENTAL PROCEDURE DETAILS
Subjective   Patient ID: Libby Stovall is a 54 y.o. female.  Chief Complaint   Patient presents with    New Patient Visit    Emergency/limited Exam     Reviewed medical history:  Pt on Xarelto for chronic deep vein thrombosis  ASA II  Pain - 8    CC:  Severe pain on LL tooth    Today:  Limited exam, 1 PA #20, Ext #20    DX:  Severe bone loss #20 and Class III mobility due to periodontal disease.  ---Obtained consent for an extraction  ---Anesthesia given by Dr. KATHRYN Lambert - topical applied - Septocaine - 1 1/2  carpules w/ short needle  ---Tooth extracted using forceps  ---Pt did well - minimal bleeding - postop instructions given  ---Recommended pt return for a Comp, PAN, FMP.   Pt has not been to a dentist in a long time.  She does have a UPD which is about 30 yrs old.    Exam:  Dr KATHRYN Lambert  Referral:  none    NV1:  Comp, PAN, FMP - 50 min

## 2024-06-06 NOTE — DENTAL PROCEDURE DETAILS
Subjective   Patient ID: Libby Stovall is a 54 y.o. female.  Chief Complaint   Patient presents with    New Patient Visit    Emergency/limited Exam     Reviewed medical history   ASA II  Pain - 8    CC:  Tooth on LL very loose and I have a lot of pain    Today:  Limited Exam, 1 PA #20    DX:  #20 - Severe bone loss and Grade III mobility - needs ext    Exam:  Referral:  none    NV1:  Comp, FMX, FMP